# Patient Record
Sex: FEMALE | URBAN - METROPOLITAN AREA
[De-identification: names, ages, dates, MRNs, and addresses within clinical notes are randomized per-mention and may not be internally consistent; named-entity substitution may affect disease eponyms.]

---

## 2024-01-16 ENCOUNTER — OFFICE VISIT (OUTPATIENT)
Dept: URBAN - METROPOLITAN AREA TELEHEALTH 2 | Facility: TELEHEALTH | Age: 49
End: 2024-01-16
Payer: COMMERCIAL

## 2024-01-16 ENCOUNTER — DASHBOARD ENCOUNTERS (OUTPATIENT)
Age: 49
End: 2024-01-16

## 2024-01-16 DIAGNOSIS — R12 HEARTBURN: ICD-10-CM

## 2024-01-16 PROCEDURE — 992 APS NON BILLABLE: Performed by: NURSE PRACTITIONER

## 2024-01-16 PROCEDURE — 993 AGA: Performed by: NURSE PRACTITIONER

## 2024-01-16 NOTE — HPI-TODAY'S VISIT:
Very pleasant 48 yr old female seen today for gerd symptoms. She reports PCP recommended to see GI. She has heartburn after taking prozac so takes PPI and symptoms are controlled. She denies alarm symptoms. She had colonoscopy in the past few yrs. reportedly normal.